# Patient Record
Sex: FEMALE | Race: BLACK OR AFRICAN AMERICAN | Employment: UNEMPLOYED | URBAN - METROPOLITAN AREA
[De-identification: names, ages, dates, MRNs, and addresses within clinical notes are randomized per-mention and may not be internally consistent; named-entity substitution may affect disease eponyms.]

---
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
## 2024-04-09 NOTE — ED PROVIDER NOTES
HPI Comments: 11 y.o. female with no significant past medical history who presents with chief complaint of sore throat. Per pt she has experienced ongoing symptoms of a sore throat, cough, sneezing and intermittent headache over the past few days. Pt's mother reports that the pt has also experienced a few episodes N/V. No additional episodes of N/V have occurred today per pt. The mother adds that the pt has not received her influenza vaccination this year and is concerned she may have the flue. Due to this, the pt is present to the ED today. Currently, the pt notes she is not experiencing any pain other than mild discomfort to her throat. She denies fever, chills, diarrhea, CP, SOB, back pain, dizziness and urinary symptoms. There are no other acute medical concerns at this time. Social hx: GERONIMO SAEZ; Lives with parents. PCP: No primary care provider on file. Note written by Gracie Conley, as dictated by Cb Andino MD 7:11 PM            The history is provided by the patient and the mother. No  was used. Pediatric Social History:         No past medical history on file. No past surgical history on file. No family history on file. Social History     Social History    Marital status: SINGLE     Spouse name: N/A    Number of children: N/A    Years of education: N/A     Occupational History    Not on file. Social History Main Topics    Smoking status: Not on file    Smokeless tobacco: Not on file    Alcohol use Not on file    Drug use: Not on file    Sexual activity: Not on file     Other Topics Concern    Not on file     Social History Narrative         ALLERGIES: Banana    Review of Systems   Constitutional: Negative for activity change, appetite change, fatigue and fever. HENT: Negative for ear pain, facial swelling, sore throat and trouble swallowing. Eyes: Negative for pain, discharge and visual disturbance.    Respiratory: Positive for cough. Negative for chest tightness, shortness of breath and wheezing. Cardiovascular: Negative for chest pain and palpitations. Gastrointestinal: Positive for nausea and vomiting. Negative for abdominal pain and blood in stool. Genitourinary: Negative for difficulty urinating, flank pain and hematuria. Musculoskeletal: Negative for arthralgias, joint swelling, myalgias and neck pain. Skin: Negative for color change and rash. Neurological: Positive for headaches. Negative for dizziness, weakness and numbness. Hematological: Negative for adenopathy. Does not bruise/bleed easily. Psychiatric/Behavioral: Negative for behavioral problems, confusion and sleep disturbance. All other systems reviewed and are negative. Vitals:    02/01/18 1849   BP: 120/74   Resp: 18   Temp: 98.7 °F (37.1 °C)   SpO2: 100%   Weight: 54.9 kg   Height: 157.5 cm            Physical Exam   Constitutional: She appears well-developed and well-nourished. HENT:   Head: Normocephalic and atraumatic. Mouth/Throat: Oropharynx is clear and moist.   Eyes: EOM are normal. Pupils are equal, round, and reactive to light. Neck: Normal range of motion. Neck supple. Cardiovascular: Normal rate, regular rhythm, normal heart sounds and intact distal pulses. Exam reveals no gallop and no friction rub. No murmur heard. Pulmonary/Chest: Effort normal. No respiratory distress. She has no wheezes. She has no rales. Abdominal: Soft. There is no tenderness. There is no rebound. Musculoskeletal: Normal range of motion. She exhibits no tenderness. Neurological: She is alert. No cranial nerve deficit. Motor; symmetric   Skin: No erythema. Psychiatric: She has a normal mood and affect. Her behavior is normal.   Nursing note and vitals reviewed.      Note written by Gracie Martins, as dictated by Lauri Rodriguez MD 7:11 PM    [facility]      ED Course       Procedures 09-Apr-2024 23:42